# Patient Record
Sex: FEMALE | Race: WHITE
[De-identification: names, ages, dates, MRNs, and addresses within clinical notes are randomized per-mention and may not be internally consistent; named-entity substitution may affect disease eponyms.]

---

## 2020-03-26 ENCOUNTER — HOSPITAL ENCOUNTER (EMERGENCY)
Dept: HOSPITAL 43 - DL.ED | Age: 55
Discharge: HOME | End: 2020-03-26
Payer: COMMERCIAL

## 2020-03-26 DIAGNOSIS — K21.9: Primary | ICD-10-CM

## 2020-03-26 DIAGNOSIS — Z88.8: ICD-10-CM

## 2020-03-26 LAB
ANION GAP SERPL CALC-SCNC: 13.2 MEQ/L (ref 7–13)
CHLORIDE SERPL-SCNC: 105 MMOL/L (ref 98–107)
SODIUM SERPL-SCNC: 142 MMOL/L (ref 136–145)

## 2020-03-26 PROCEDURE — 83880 ASSAY OF NATRIURETIC PEPTIDE: CPT

## 2020-03-26 PROCEDURE — 84484 ASSAY OF TROPONIN QUANT: CPT

## 2020-03-26 PROCEDURE — 85025 COMPLETE CBC W/AUTO DIFF WBC: CPT

## 2020-03-26 PROCEDURE — 80053 COMPREHEN METABOLIC PANEL: CPT

## 2020-03-26 PROCEDURE — 71045 X-RAY EXAM CHEST 1 VIEW: CPT

## 2020-03-26 PROCEDURE — 36415 COLL VENOUS BLD VENIPUNCTURE: CPT

## 2020-03-26 PROCEDURE — 99285 EMERGENCY DEPT VISIT HI MDM: CPT

## 2020-03-26 PROCEDURE — 93005 ELECTROCARDIOGRAM TRACING: CPT

## 2020-03-26 NOTE — EDM.PDOC
ED HPI GENERAL MEDICAL PROBLEM





- General


Chief Complaint: Chest Pain


Stated Complaint: CHEST PAIN


Time Seen by Provider: 03/26/20 08:40


Source of Information: Reports: Patient, RN, RN Notes Reviewed


History Limitations: Reports: No Limitations





- History of Present Illness


INITIAL COMMENTS - FREE TEXT/NARRATIVE: 


patient presents to ER with complaint of midsternal chest pain that began 

yesterday afternoon. Patient denies radiation of the pain to the neck, jaw, 

arms. Patient denies palpitations in the chest. Patient rates pain 6/10, 

improves when laying her backwith head of bed at about 45. Patient states 

history of asthma, smoking half a pack a day.Patient denies any other health 

problems, does not cur medications daily. Patient denies nausea, vomiting, 

diarrhea, increased shortness of breath fever or chills. Patient denies cough 

that is new or increased.


Onset: Gradual


Onset Date: 03/25/20


Duration: Intermittent


Location: Reports: Chest


Quality: Reports: Pressure


Severity: Moderate


Improves with: Reports: Other (Lying on her back)


Worsens with: Reports: Other (sitting up)


Associated Symptoms: Reports: No Other Symptoms


  ** Middle Chest


Pain Score (Numeric/FACES): 6





- Related Data


 Allergies











Allergy/AdvReac Type Severity Reaction Status Date / Time


 


acetaminophen [From Midol] Allergy  Rash Verified 03/26/20 08:37


 


pamabrom [From Midol] Allergy  Rash Verified 03/26/20 08:37


 


pyrilamine Allergy  Rash Verified 03/26/20 08:37











Home Meds: 


 Home Meds





Albuterol Sulfate [Albuterol Sulfate Hfa] 8.5 gm IH Q4HR PRN 03/26/20 [History]











ED ROS GENERAL





- Review of Systems


Review Of Systems: Comprehensive ROS is negative, except as noted in HPI.





ED EXAM, GENERAL





- Physical Exam


Exam: See Below


Exam Limited By: No Limitations


General Appearance: Alert, WD/WN, No Apparent Distress


Eye Exam: Bilateral Eye: EOMI, Normal Inspection


Ears: Normal External Exam, Hearing Grossly Normal


Nose: Normal Inspection


Throat/Mouth: Normal Inspection, Normal Voice, No Airway Compromise


Head: Atraumatic, Normocephalic


Neck: Normal Inspection, Supple, Non-Tender, Full Range of Motion


Respiratory/Chest: No Respiratory Distress, No Accessory Muscle Use, Chest Non-

Tender, Wheezing


Cardiovascular: Normal Peripheral Pulses, Regular Rate, Rhythm, No Edema, No 

Gallop, No JVD, No Murmur, No Rub, JVD


Peripheral Pulses: 2+: Radial (L), Radial (R)


GI/Abdominal: Normal Bowel Sounds, Soft, Non-Tender, Tender (epigastrum)


 (Female) Exam: Deferred


Rectal (Female) Exam: Deferred


Back Exam: Normal Inspection, Full Range of Motion, NT


Extremities: Normal Inspection, Normal Range of Motion, Non-Tender, No Pedal 

Edema, Normal Capillary Refill


Neurological: Alert, Oriented, CN II-XII Intact, Normal Cognition, Normal Gait, 

Normal Reflexes, No Motor/Sensory Deficits


Psychiatric: Normal Affect, Normal Mood


Skin Exam: Warm, Dry, Intact, Normal Color, No Rash


Lymphatic: No Adenopathy





Course





- Vital Signs


Last Recorded V/S: 


 Last Vital Signs











Temp  98 F   03/26/20 08:33


 


Pulse  59 L  03/26/20 08:33


 


Resp  20   03/26/20 08:33


 


BP  138/85   03/26/20 08:33


 


Pulse Ox  97   03/26/20 08:33














- Orders/Labs/Meds


Orders: 


 Active Orders 24 hr











 Category Date Time Status


 


 EKG Documentation Completion [RC] STAT Care  03/26/20 08:42 Active


 


 Peripheral IV Care [RC] .AS DIRECTED Care  03/26/20 08:42 Active


 


 Chest 1V Frontal [CR] Stat Exams  03/26/20 08:42 Taken


 


 Sodium Chloride 0.9% [Saline Flush] Med  03/26/20 08:41 Active





 10 ml FLUSH ASDIRECTED PRN   


 


 Peripheral IV Insertion Adult [OM.PC] Stat Oth  03/26/20 08:42 Ordered








 Medication Orders





Sodium Chloride (Saline Flush)  10 ml FLUSH ASDIRECTED PRN


   PRN Reason: Keep Vein Open


   Last Admin: 03/26/20 08:44  Dose: 10 ml








Labs: 


 Laboratory Tests











  03/26/20 03/26/20 Range/Units





  08:40 08:40 


 


WBC  6.5   (5.0-10.0)  10^3/uL


 


RBC  4.39   (4.2-5.4)  10^6/uL


 


Hgb  13.8   (12.0-16.0)  g/dL


 


Hct  41.0   (37.0-47.0)  %


 


MCV  93.4   ()  fL


 


MCH  31.4   (27.0-34.0)  pg


 


MCHC  33.7   (33.0-35.0)  g/dL


 


Plt Count  210   (150-450)  10^3/uL


 


Neut % (Auto)  52.5   (42.2-75.2)  %


 


Lymph % (Auto)  34.4   (20.5-50.1)  %


 


Mono % (Auto)  9.1 H   (2-8)  %


 


Eos % (Auto)  3.2 H   (1.0-3.0)  %


 


Baso % (Auto)  0.8   (0.0-1.0)  %


 


Sodium   142  (136-145)  mmol/L


 


Potassium   4.2  (3.5-5.1)  mmol/L


 


Chloride   105  ()  mmol/L


 


Carbon Dioxide   28  (21-32)  mmol/L


 


Anion Gap   13.2 H  (7-13)  mEq/L


 


BUN   15  (7-18)  mg/dL


 


Creatinine   0.87  (0.55-1.02)  mg/dL


 


Est Cr Clr Drug Dosing   63.09  mL/min


 


Estimated GFR (MDRD)   > 60  


 


BUN/Creatinine Ratio   17.2  (No establ ref range)  


 


Glucose   97  (74-99)  mg/dL


 


Calcium   8.0 L  (8.5-10.1)  mg/dL


 


Total Bilirubin   0.6  (0.2-1.0)  mg/dL


 


AST   11 L  (15-37)  U/L


 


ALT   22  (14-59)  U/L


 


Alkaline Phosphatase   88  ()  U/L


 


Troponin I   < 0.017  (0.000-0.056)  ng/mL


 


B-Natriuretic Peptide   40  (0-100)  pg/ml


 


Total Protein   6.9  (6.4-8.2)  g/dL


 


Albumin   3.7  (3.4-5.0)  g/dL


 


Globulin   3.2  


 


Albumin/Globulin Ratio   1.2  











Meds: 


Medications











Generic Name Dose Route Start Last Admin





  Trade Name Freq  PRN Reason Stop Dose Admin


 


Sodium Chloride  10 ml  03/26/20 08:41  03/26/20 08:44





  Saline Flush  FLUSH   10 ml





  ASDIRECTED PRN   Administration





  Keep Vein Open   





     





     





     














Discontinued Medications














Generic Name Dose Route Start Last Admin





  Trade Name Freq  PRN Reason Stop Dose Admin


 


Al Hydroxide/Mg Hydroxide  30 ml  03/26/20 08:50  03/26/20 08:59





  Gi Cocktail  PO  03/26/20 08:51  30 ml





  ONETIME ONE   Administration





     





     





     





     














- Radiology Interpretation


Free Text/Narrative:: 


Chest xray:


FINDINGS:


Lungs: Unremarkable. No consolidation.


Pleural space: Unremarkable. No pleural effusion. No pneumothorax.


Heart/Mediastinum: Unremarkable. No cardiomegaly.


Bones/joints: Unremarkable for age.


IMPRESSION:


Unremarkable chest radiograph.


Thank you for allowing us to participate in the care of your patient.


Dictated and Authenticated by: Elsi Fuchs MD


03/26/2020 9:01 AM Central Time ([place] & [place])








See rad report








Departure





- Departure


Time of Disposition: 09:30


Disposition: Admitted As Inpatient 65


Reason for Transfer *Q: Other


Condition: Good


Clinical Impression: 


Gastroesophageal reflux disease


Qualifiers:


 Esophagitis presence: without esophagitis Qualified Code(s): K21.9 - Gastro-

esophageal reflux disease without esophagitis





Instructions:  Indigestion, Easy-to-Read, Food Choices for Gastroesophageal 

Reflux Disease, Adult, Easy-to-Read


Forms:  ED Department Discharge


Additional Instructions: 


Rx: Omeprazole 20 mg


Drink plenty of water


Do not lay down for at least 30 minutes after eating


Lay with head of bed elevated 30-45


Follow-up with your primary care provider


Return the ER with any worsening of problems





Sepsis Event Note





- Evaluation


Sepsis Screening Result: No Definite Risk





- Focused Exam


Vital Signs: 


 Vital Signs











  Temp Pulse Resp BP Pulse Ox


 


 03/26/20 08:33  98 F  59 L  20  138/85  97











Date Exam was Performed: 03/26/20


Time Exam was Performed: 09:36





- My Orders


Last 24 Hours: 


My Active Orders





03/26/20 08:41


Sodium Chloride 0.9% [Saline Flush]   10 ml FLUSH ASDIRECTED PRN 





03/26/20 08:42


EKG Documentation Completion [RC] STAT 


Peripheral IV Care [RC] .AS DIRECTED 


Chest 1V Frontal [CR] Stat 


Peripheral IV Insertion Adult [OM.PC] Stat 














- Assessment/Plan


Last 24 Hours: 


My Active Orders





03/26/20 08:41


Sodium Chloride 0.9% [Saline Flush]   10 ml FLUSH ASDIRECTED PRN 





03/26/20 08:42


EKG Documentation Completion [RC] STAT 


Peripheral IV Care [RC] .AS DIRECTED 


Chest 1V Frontal [CR] Stat 


Peripheral IV Insertion Adult [OM.PC] Stat

## 2022-04-20 ENCOUNTER — HOSPITAL ENCOUNTER (EMERGENCY)
Dept: HOSPITAL 43 - DL.ED | Age: 57
Discharge: HOME | End: 2022-04-20
Payer: COMMERCIAL

## 2022-04-20 DIAGNOSIS — R94.6: ICD-10-CM

## 2022-04-20 DIAGNOSIS — Z20.822: ICD-10-CM

## 2022-04-20 DIAGNOSIS — J45.909: ICD-10-CM

## 2022-04-20 DIAGNOSIS — Z88.8: ICD-10-CM

## 2022-04-20 DIAGNOSIS — Z90.710: ICD-10-CM

## 2022-04-20 DIAGNOSIS — E86.0: Primary | ICD-10-CM

## 2022-04-20 DIAGNOSIS — R06.02: ICD-10-CM

## 2022-04-20 LAB
ANION GAP SERPL CALC-SCNC: 15.6 MEQ/L (ref 7–13)
CHLORIDE SERPL-SCNC: 102 MMOL/L (ref 98–107)
SARS-COV-2 RNA RESP QL NAA+PROBE: NEGATIVE
SODIUM SERPL-SCNC: 139 MMOL/L (ref 136–145)